# Patient Record
Sex: MALE | Race: WHITE
[De-identification: names, ages, dates, MRNs, and addresses within clinical notes are randomized per-mention and may not be internally consistent; named-entity substitution may affect disease eponyms.]

---

## 2018-11-02 ENCOUNTER — HOSPITAL ENCOUNTER (EMERGENCY)
Dept: HOSPITAL 26 - MED | Age: 58
Discharge: HOME | End: 2018-11-02
Payer: COMMERCIAL

## 2018-11-02 VITALS — DIASTOLIC BLOOD PRESSURE: 84 MMHG | SYSTOLIC BLOOD PRESSURE: 131 MMHG

## 2018-11-02 VITALS — BODY MASS INDEX: 30.07 KG/M2 | HEIGHT: 66 IN | WEIGHT: 187.12 LBS

## 2018-11-02 VITALS — SYSTOLIC BLOOD PRESSURE: 131 MMHG | DIASTOLIC BLOOD PRESSURE: 84 MMHG
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
## 2018-11-02 DIAGNOSIS — M70.871: Primary | ICD-10-CM

## 2018-11-02 PROCEDURE — 99284 EMERGENCY DEPT VISIT MOD MDM: CPT

## 2018-11-02 PROCEDURE — 73610 X-RAY EXAM OF ANKLE: CPT

## 2018-11-02 PROCEDURE — 73630 X-RAY EXAM OF FOOT: CPT

## 2018-11-02 NOTE — NUR
Patient discharged with v/s stable. Written and verbal after care instructions 
given and explained. 

Patient alert, oriented and verbalized understanding of instructions. 
Ambulatory with steady gait. All questions addressed prior to discharge. ID 
band removed. Patient advised to follow up with PMD. Rx of NAPROSYN given. 
Patient educated on indication of medication including possible reaction and 
side effects. Opportunity to ask questions provided and answered.

## 2018-11-02 NOTE — NUR
C/O RT ANKLE PAIN X 1 WK WORSE ON AMBULATION; DENIES INJURY.  DENIES N/V/D; 
SKIN IS PINK/WARM/DRY; AAOX4 WITH EVEN AND STEADY GAIT; LUNGS CLEAR BL; HR EVEN 
AND REGULAR; PT DENIES ANY FEVER, CP, SOB, OR COUGH AT THIS TIME; PATIENT 
STATES PAIN OF 0/10 AT THIS TIME; VSS; PATIENT POSITIONED FOR COMFORT; HOB 
ELEVATED; BEDRAILS UP X2; BED DOWN. ER MD MADE AWARE OF PT STATUS.

## 2018-11-02 NOTE — NUR
-------------------------------------------------------------------------------

            *** Note undone in [facility] - 11/02/18 at 1141 by FRENCH ***            

-------------------------------------------------------------------------------

DR ARRIAGA AT [facility].